# Patient Record
Sex: FEMALE | Race: WHITE | URBAN - METROPOLITAN AREA
[De-identification: names, ages, dates, MRNs, and addresses within clinical notes are randomized per-mention and may not be internally consistent; named-entity substitution may affect disease eponyms.]

---

## 2017-07-26 ENCOUNTER — ALLSCRIPTS OFFICE VISIT (OUTPATIENT)
Dept: OTHER | Facility: OTHER | Age: 32
End: 2017-07-26

## 2017-07-26 DIAGNOSIS — R10.2 PELVIC AND PERINEAL PAIN: ICD-10-CM

## 2017-07-26 DIAGNOSIS — R30.0 DYSURIA: ICD-10-CM

## 2017-07-26 DIAGNOSIS — R31.29 OTHER MICROSCOPIC HEMATURIA: ICD-10-CM

## 2017-07-26 LAB
BILIRUB UR QL STRIP: NEGATIVE
CLARITY UR: NORMAL
COLOR UR: YELLOW
GLUCOSE (HISTORICAL): NEGATIVE
HGB UR QL STRIP.AUTO: NORMAL
KETONES UR STRIP-MCNC: 1 MG/DL
LEUKOCYTE ESTERASE UR QL STRIP: NEGATIVE
NITRITE UR QL STRIP: NEGATIVE
PH UR STRIP.AUTO: 5 [PH]
PROT UR STRIP-MCNC: NEGATIVE MG/DL
SP GR UR STRIP.AUTO: 1.03
UROBILINOGEN UR QL STRIP.AUTO: 0.2

## 2017-07-28 ENCOUNTER — LAB CONVERSION - ENCOUNTER (OUTPATIENT)
Dept: OTHER | Facility: OTHER | Age: 32
End: 2017-07-28

## 2017-07-28 LAB — CULTURE RESULT (HISTORICAL): NORMAL

## 2017-08-02 ENCOUNTER — GENERIC CONVERSION - ENCOUNTER (OUTPATIENT)
Dept: OTHER | Facility: OTHER | Age: 32
End: 2017-08-02

## 2018-01-11 NOTE — PROGRESS NOTES
Active Problems    1  Acute pelvic pain, female (625 9) (R10 2)   2  Bacterial vaginitis (616 10,041 9) (N76 0,B96 89)   3  Contraceptive, surveillance, intrauterine device (V25 42) (Z30 431)   4  Dietary calcium deficiency (269 3) (E58)   5  Dysuria (788 1) (R30 0)   6  Lack of exercise (V69 0) (Z72 3)   7  Metrorrhagia (626 6) (N92 1)   8  Overweight (BMI 25 0-29 9) (278 02) (E66 3)   9  Pap smear for cervical cancer screening (V76 2) (Z12 4)   10  Well female exam with routine gynecological exam (V72 31) (Z01 419)   11  Yeast vaginitis (112 1) (B37 3)    Current Meds   1  Ibuprofen 600 MG Oral Tablet; TAKE 1 TABLET EVERY 6 HOURS AS NEEDED; Therapy: 19OOY3882 to (Alexis Dalton)  Requested for: 25Mar2016; Last   Rx:25Mar2016 Ordered   2  MetroNIDAZOLE 500 MG Oral Tablet; TAKE 1 TABLET TWICE DAILY UNTIL FINISHED; Therapy: 51JRF3265 to (Evaluate:51Pay9635)  Requested for: 90ROY6331; Last   Rx:41Owd2290 Ordered   3  Mirena 20 MCG/24HR Intrauterine Intrauterine Device; USE AS DIRECTED; Therapy: 55MJC0112 to Recorded    Allergies    1  No Known Drug Allergies    Message   Date: 28 Sep 2016 2:08 PM EST, Recorded By: Floyde Frankel   Calling For: Isadora Rollins   Caller: Thierno Harley   Phone: [phone] (Home)   Reason: General Medical Question   Pt  called the office stating that she feels like she has an infection  Asked more details and was told when she urinates  Pt  asked for appt  on Friday  Pt  notified that only nurse appt  s available as SB schedule is booked  Pt  notified that if she comes on Friday that we would dip her urine and consult with Dr  for possible treatment       Signatures   Electronically signed by : BERTHA Love ; Sep 28 2016  4:53PM EST                       (Author)

## 2018-01-14 VITALS
HEIGHT: 65 IN | SYSTOLIC BLOOD PRESSURE: 108 MMHG | BODY MASS INDEX: 28.82 KG/M2 | DIASTOLIC BLOOD PRESSURE: 70 MMHG | WEIGHT: 173 LBS

## 2018-01-15 NOTE — PROGRESS NOTES
Active Problems    1  Acute pelvic pain, female (625 9) (R10 2)   2  Allergic contact dermatitis due to other agents (692 89) (L23 89)   3  Contraceptive, surveillance, intrauterine device (V25 42) (Z30 431)   4  Dietary calcium deficiency (269 3) (E58)   5  Dysuria (788 1) (R30 0)   6  Lack of exercise (V69 0) (Z72 3)   7  Microscopic hematuria (599 72) (R31 29)   8  Overweight (BMI 25 0-29 9) (278 02) (E66 3)   9  Pap smear for cervical cancer screening (V76 2) (Z12 4)   10  Well female exam with routine gynecological exam (V72 31) (Z01 419)    Current Meds   1  Mirena (52 MG) 20 MCG/24HR Intrauterine Intrauterine Device; USE AS DIRECTED; Therapy: 67TMF6686 to Recorded   2  Nystatin-Triamcinolone 439305-7 1 UNIT/GM-% External Ointment; APPLY SPARINGLY   TO AFFECTED AREA TWICE A DAY FOR 7   TO 14 DAYS; Therapy: 33XTI3307 to (Last Rx:79Pzn8973)  Requested for: 95Ebt2198 Ordered    Allergies    1  No Known Drug Allergies    Message   Recorded as Task   Date: 07/31/2017 08:14 AM, Created By: Tim Montero   Task Name: Go to Result   Assigned To: Isadora Rollins   Regarding Patient: Bennie Mercer, Status: Active   Comment:    Hyacinth Perez - 31 Jul 2017 8:14 AM     TASK CREATED  please notify the patient that her urine culture is negative  I recommend follow up with urology as she had a large amount of blood in her urine  Watch file for results  Thanks   Isadora Rollins - 01 Aug 2017 11:15 AM     TASK REPLIED TO: Previously Assigned To Isadora Rollins  Pt  aware but wants an explanation in [place]  Please call  Hyacinth Perez - 01 Aug 2017 7:00 PM     TASK REPLIED TO: Previously Assigned To Hyacinth Perez  I called the patient back and reviewed with her that her urine culture was negative, but she has blood in her urine--rec urology follow up  She reports she is moving to [place] tomorrow and will follow up there  Also reviewed that her u/s is wnl       Signatures   Electronically signed by : BERTHA Verma ; Aug  2 2017 8:48AM EST                       (Author)

## 2023-02-14 ENCOUNTER — TELEPHONE (OUTPATIENT)
Dept: OBGYN CLINIC | Facility: CLINIC | Age: 38
End: 2023-02-14

## 2023-02-14 NOTE — TELEPHONE ENCOUNTER
Pt was last seen in 2017 and is considered a new patient  I cannot give advise without the patient establishing care     Please advise the patient of the same and have her schedule an appointment if she has a problem

## 2023-02-15 ENCOUNTER — TELEPHONE (OUTPATIENT)
Dept: OBGYN CLINIC | Facility: CLINIC | Age: 38
End: 2023-02-15

## 2023-02-15 NOTE — TELEPHONE ENCOUNTER
Pt now resides in [place], last seen Dr Juanjose Wagoner 2017  Self pay patient  Pt booked for NP problem appt with Casper Blackwell on 2/16 at 245pm, but only will be seen if records are sent over ahead of time  Pt understands that  Advised patient to email or fax her imaging results from her current doctors that highlight her gyn issues- possible cysts on left ovary  Wants to confirm if what she has been told is accurate and if surgery must be considered  Pt understands she is not seeing Dr Juanjose Wagoner and is ok with that  FYI

## 2023-02-16 NOTE — TELEPHONE ENCOUNTER
called the number of the gyn office Wendy gave me [phone] to get the records from  They are [facility]  If the records are not in her chart for the appointment tomorrow, she will need to reschedule  The gyn office read the office not from Wendy's PCP there was not mention of a cyst but that her IUD was upside down

## 2023-02-17 ENCOUNTER — TELEPHONE (OUTPATIENT)
Dept: GYNECOLOGY | Facility: CLINIC | Age: 38
End: 2023-02-17

## 2023-02-17 ENCOUNTER — OFFICE VISIT (OUTPATIENT)
Dept: GYNECOLOGY | Facility: CLINIC | Age: 38
End: 2023-02-17

## 2023-02-17 VITALS — DIASTOLIC BLOOD PRESSURE: 70 MMHG | BODY MASS INDEX: 30.25 KG/M2 | WEIGHT: 179 LBS | SYSTOLIC BLOOD PRESSURE: 120 MMHG

## 2023-02-17 DIAGNOSIS — N83.202 LEFT OVARIAN CYST: ICD-10-CM

## 2023-02-17 DIAGNOSIS — T83.32XA INTRAUTERINE DEVICE (IUD) MIGRATION, INITIAL ENCOUNTER: Primary | ICD-10-CM

## 2023-02-17 RX ORDER — CHOLECALCIFEROL (VITAMIN D3) 10(400)/ML
400 DROPS ORAL DAILY
COMMUNITY

## 2023-02-17 NOTE — TELEPHONE ENCOUNTER
Pt was asking if she could have a letter sent to her boss Sunday Bence at [email or web address] stating she is not having surgery due to the fact that she does not have coverage  She also provided her email if you need to speak with her

## 2023-02-17 NOTE — PROGRESS NOTES
Assessment/Plan:    Advised pt should have a formal ultrasound to evaluate exact position of IUD and need to replace as well as to evaluate the left ovarian cyst  Pt states they advised her of the same in [place]  She was advised to return to gyn for vulvar biopsy of lesion on left labia majora  Diagnoses and all orders for this visit:    Intrauterine device (IUD) migration, initial encounter    Left ovarian cyst            Subjective:      Patient ID: Celestine Barnard is a 40 y o  female  New pt who resides in [place] is in [place] for the weekend and requested a visit with Dr Yoseph Norton who has seen her in the past  Dr Yoseph Norton was not available and she was agreeable to seeing me  She presents because she was seen in [place] on 2/15/23, 2 days ago and per office note an in office ultrasound revealed a "rotated IUD" and a "0 6 cm solid-looking tumor consistent with dermoid " Pt states she is unsure why the in office ultrasound was done  Pt is unsure which IUD she has  No other gyn records are available for review today  She states she has pain with intercourse but states she did not discuss that with provider in [place]  She states she has seen several providers in [place]  The following portions of the patient's history were reviewed and updated as appropriate: allergies, current medications, past family history, past medical history, past social history, past surgical history and problem list     Review of Systems   Constitutional: Negative  HENT: Negative  Respiratory: Negative  Cardiovascular: Negative  Gastrointestinal: Negative  Endocrine: Negative  Genitourinary: Positive for dyspareunia  Negative for difficulty urinating, dysuria, frequency, menstrual problem, pelvic pain, urgency, vaginal bleeding, vaginal discharge and vaginal pain  Musculoskeletal: Negative  Skin: Negative  Neurological: Negative  Psychiatric/Behavioral: Negative            Objective:      /70   Wt 81 2 kg (179 lb)   BMI 30 25 kg/m²          Physical Exam  Vitals and nursing note reviewed  Exam conducted with a chaperone present  Constitutional:       Appearance: Normal appearance  HENT:      Head: Normocephalic and atraumatic  Pulmonary:      Effort: Pulmonary effort is normal    Abdominal:      Hernia: There is no hernia in the left inguinal area or right inguinal area  Genitourinary:     Pubic Area: No rash  Labia:         Right: No rash, tenderness, lesion or injury  Left: Lesion (pedunculated lesions noted) present  No rash, tenderness or injury  Urethra: No urethral pain, urethral swelling or urethral lesion  Vagina: No signs of injury and foreign body  No vaginal discharge, erythema, tenderness, bleeding or lesions  Cervix: No cervical motion tenderness, discharge, friability, lesion, erythema, cervical bleeding or eversion  Uterus: Not deviated, not enlarged, not fixed and not tender  Adnexa:         Right: No mass, tenderness or fullness  Left: No mass, tenderness or fullness  Comments: IUD strings noted extending 2 cms from cervical os  Musculoskeletal:         General: Normal range of motion  Cervical back: Normal range of motion  Lymphadenopathy:      Lower Body: No right inguinal adenopathy  No left inguinal adenopathy  Skin:     General: Skin is warm and dry  Neurological:      Mental Status: She is alert and oriented to person, place, and time  Psychiatric:         Mood and Affect: Mood normal          Behavior: Behavior normal          Thought Content:  Thought content normal          Judgment: Judgment normal